# Patient Record
Sex: FEMALE | Race: WHITE | NOT HISPANIC OR LATINO | Employment: OTHER
[De-identification: names, ages, dates, MRNs, and addresses within clinical notes are randomized per-mention and may not be internally consistent; named-entity substitution may affect disease eponyms.]

---

## 2024-06-24 ENCOUNTER — TRANSCRIBE ORDERS (OUTPATIENT)
Dept: OTHER | Age: 79
End: 2024-06-24

## 2024-06-24 DIAGNOSIS — G25.3 MYOCLONIC JERKING: Primary | ICD-10-CM

## 2024-06-26 ASSESSMENT — MOVEMENT DISORDERS SOCIETY - UNIFIED PARKINSONS DISEASE RATING SCALE (MDS-UPDRS)
CHEWING_AND_SWALLOWING: (0) NORMAL: NO PROBLEMS.
SALIVA_AND_DROOLING: (0) NORMAL: NOT AT ALL (NO PROBLEMS).
EATING_TASKS: (0) NORMAL: NOT AT ALL (NO PROBLEMS).
FREEZING: (1) SLIGHTLY: I BRIEFLY FREEZE, BUT I CAN EASILY START WALKING AGAIN. I DO NOT NEED HELP FROM SOMEONE ELSE OR A WALKING AID (CANE OR WALKER) BECAUSE OF FREEZING.
HOBBIES_AND_OTHER_ACTIVITIES: (1) SLIGHT: I AM A BIT SLOW BUT DO THESE ACTIVITIES EASILY.
WALKING_AND_BALANCE: (3) MODERATE: I USUALLY USE A WALKING AID (CANE, WALKER) TO WALK SAFELY WITHOUT FALLING.  HOWEVER, I DO NOT USUALLY NEED THE SUPPORT OF ANOTHER PERSON.
TOTAL_SCORE: 16
HYGIENE: (2) MILD: I NEED SOMEONE ELSE TO HELP ME WITH SOME HYGIENE TASKS.
TREMOR: (4) SEVERE: SHAKING OR TREMOR CAUSES PROBLEMS WITH MOST OR ALL ACTIVITIES.
GETTING_OUT_OF_BED_CAR_DEEP_CHAIR: (2) MILD: I NEED MORE THAN ONE TRY TO GET UP OR NEED OCCASIONAL HELP.
DRESSING: (1) SLIGHT: I AM SLOW, BUT I DO NOT NEED HELP.
HANDWRITING: (0) NORMAL: NOT AT ALL (NO PROBLEMS).
TURNING_IN_BED: (2) MILD: I HAVE A LOT OF TROUBLE TURNING AND NEED OCCASIONAL HELP FROM SOMEONE ELSE.
SPEECH: (0) NORMAL: NOT AT ALL (NO PROBLEMS).

## 2024-06-26 NOTE — TELEPHONE ENCOUNTER
Action 6/26/24 MV 9.34am   Action Taken Imaging request faxed to [facility]     Action 6/28/24 MV 7.34am   Action Taken Images resolved       RECORDS RECEIVED FROM: external   REASON FOR VISIT: Myoclonic jerking    PROVIDER: Dr. Mathew Ortiz   DATE OF APPT: 6/28/24   NOTES (FOR ALL VISITS) STATUS DETAILS   OFFICE NOTE from referring provider Care Everywhere Dr Huan Thompson @ [facility]:  6/18/24   OFFICE NOTE from other specialist Care Everywhere Dr Tiffany Gonzales @ [facility] Neurology:  6/20/24 5/17/24   DISCHARGE REPORT from the ER Care Everywhere [facility]:  5/24/24   MEDICATION LIST Care Everywhere    IMAGING  (FOR ALL VISITS)     MRI (HEAD, NECK, SPINE) PACS [facility]:  MRI Lumbar Spine 1/2/24  MRI Thoracic Spine 11/2/23  MRI Brain 9/6/23  MRI Lumbar Spine 5/17/23  MRI Lumbar Spine 6/20/21  MRI Brain 2/5/20  MRA Head Neck 2/5/20  MRI Brain 11/25/19   CT (HEAD, NECK, SPINE) PACS [facility]:  CT Head 5/24/24  CT Lumbar Spine 3/20/24  CT Thoracic Spine 3/20/24  CT Cervical Spine 3/20/24  CT Head 3/20/24  CT Head 8/27/23  CT Head 5/11/22

## 2024-06-28 ENCOUNTER — OFFICE VISIT (OUTPATIENT)
Dept: NEUROLOGY | Facility: CLINIC | Age: 79
End: 2024-06-28
Payer: COMMERCIAL

## 2024-06-28 ENCOUNTER — PRE VISIT (OUTPATIENT)
Dept: NEUROLOGY | Facility: CLINIC | Age: 79
End: 2024-06-28

## 2024-06-28 ENCOUNTER — LAB (OUTPATIENT)
Dept: LAB | Facility: CLINIC | Age: 79
End: 2024-06-28
Payer: COMMERCIAL

## 2024-06-28 ENCOUNTER — DOCUMENTATION ONLY (OUTPATIENT)
Dept: NEUROLOGY | Facility: CLINIC | Age: 79
End: 2024-06-28

## 2024-06-28 VITALS
SYSTOLIC BLOOD PRESSURE: 118 MMHG | DIASTOLIC BLOOD PRESSURE: 69 MMHG | WEIGHT: 146 LBS | HEART RATE: 54 BPM | OXYGEN SATURATION: 94 %

## 2024-06-28 DIAGNOSIS — G93.40 ENCEPHALOPATHY, UNSPECIFIED: ICD-10-CM

## 2024-06-28 DIAGNOSIS — R27.8 OTHER LACK OF COORDINATION: ICD-10-CM

## 2024-06-28 DIAGNOSIS — G25.3 MYOCLONIC JERKING: ICD-10-CM

## 2024-06-28 DIAGNOSIS — G25.3 MYOCLONIC JERKING: Primary | ICD-10-CM

## 2024-06-28 LAB — VIT B12 SERPL-MCNC: 663 PG/ML (ref 232–1245)

## 2024-06-28 PROCEDURE — 36415 COLL VENOUS BLD VENIPUNCTURE: CPT | Performed by: PATHOLOGY

## 2024-06-28 PROCEDURE — 82175 ASSAY OF ARSENIC: CPT | Mod: 90 | Performed by: PATHOLOGY

## 2024-06-28 PROCEDURE — 82607 VITAMIN B-12: CPT | Performed by: PSYCHIATRY & NEUROLOGY

## 2024-06-28 PROCEDURE — 99000 SPECIMEN HANDLING OFFICE-LAB: CPT | Performed by: PATHOLOGY

## 2024-06-28 PROCEDURE — 83825 ASSAY OF MERCURY: CPT | Mod: 90 | Performed by: PATHOLOGY

## 2024-06-28 PROCEDURE — 87798 DETECT AGENT NOS DNA AMP: CPT | Mod: 90 | Performed by: PATHOLOGY

## 2024-06-28 PROCEDURE — 99205 OFFICE O/P NEW HI 60 MIN: CPT | Performed by: PSYCHIATRY & NEUROLOGY

## 2024-06-28 PROCEDURE — 83655 ASSAY OF LEAD: CPT | Mod: 90 | Performed by: PATHOLOGY

## 2024-06-28 PROCEDURE — 86258 DGP ANTIBODY EACH IG CLASS: CPT | Mod: 59 | Performed by: PSYCHIATRY & NEUROLOGY

## 2024-06-28 PROCEDURE — 86364 TISS TRNSGLTMNASE EA IG CLAS: CPT | Mod: 59 | Performed by: PSYCHIATRY & NEUROLOGY

## 2024-06-28 RX ORDER — CHOLECALCIFEROL (VITAMIN D3) 50 MCG
1 TABLET ORAL DAILY
COMMUNITY

## 2024-06-28 RX ORDER — CALCITONIN SALMON 200 [IU]/.09ML
1 SPRAY, METERED NASAL DAILY
COMMUNITY
Start: 2023-05-15 | End: 2024-08-08

## 2024-06-28 RX ORDER — CLOBETASOL PROPIONATE 0.5 MG/G
CREAM TOPICAL 2 TIMES DAILY
COMMUNITY
Start: 2022-09-14

## 2024-06-28 RX ORDER — CLONAZEPAM 0.25 MG/1
1 TABLET, ORALLY DISINTEGRATING ORAL 2 TIMES DAILY
COMMUNITY
Start: 2024-06-20 | End: 2024-08-08

## 2024-06-28 RX ORDER — BENZONATATE 100 MG/1
100-200 CAPSULE ORAL 2 TIMES DAILY PRN
COMMUNITY
Start: 2022-09-14

## 2024-06-28 ASSESSMENT — PAIN SCALES - GENERAL: PAINLEVEL: MILD PAIN (2)

## 2024-06-28 NOTE — PATIENT INSTRUCTIONS
(1)  I'm ordering an MRI of your spine.  It may not show a cause of yourinvoluntary movements, but we it might, and we need to checki.    (2)  I'm ordering some blood tests for very unlikely possibilitie.    (3)  As we noticed today, your movements are less frequent when your attention is focussed on something else.  You might be able to control the movements, somewhat, by taking advantage of this.  To help with that, I'm referring you to a psychologist.  Psychologist might also be able to help with anxiety that your son mentioned.    (4)  Combination of trazodone with venlafaxine could cause jerking, so we'll try stopping the trazodone.  Reduce to one-half tab for one week, then stop.  We are going to move the clonazepam to bedtime, which may compensate for the lack of trazodone, so far as sleep is concerned.    (5)  Since clonazepam does help the movements, but makes you dizzy we'll try to adjust dosing of clonazepam in a way that allows you to tolerate it without side effects.  You've been off the clonazepam for 3 days. Start takint one tab at bedtime (0.25 mg), for 10 days, then, if no problems, increase to 2 tabs (0.5 mg).

## 2024-06-28 NOTE — PROGRESS NOTES
"78 yo F with trigeminal neuralgia has been very well-controlled with oxcarbazepine referred for new trunk and limb jerking.  September 20, 2017 had a lumbar spine surgery with left L4-L5 transforaminal lumbar interbody fusion and posterior spinal instrumented fusion by Dr. Mejía. She had recurrent low back pain again and MR [facility] Jan02,2024 showed  1. Moderate neuroforaminal stenosis at the right L3-4 foramen.  2. Left L5-S1 foraminal disc protrusion contacting the exiting nerve root.  3. Mild spinal canal narrowing L3-4.  4. Degenerative endplate and facet edema at L3-4    March 20 CT CTLspine Intact posterior tommy/pedicle screw fixation and intervertebral spacer L4-5. Chronic grade 1 anterolisthesis of L4 on L5. Moderate/advanced spondylosis greatest at L3-4. Neuroforaminal and spinal canal narrowing better evaluated on MRI 1/2/2024. Atypical vertebral body hemangioma in L3 is stable since 2023. Intact posterior tommy/pedicle screw fixation and intervertebral spacer L4-5. Moderate/advanced spondylosis greatest at C5-6. Grade 1 anterolisthesis of C3 on C4 and C4 on C5. Mild/moderate bony spinal canal narrowing C5-6. Multilevel bony neuroforaminal narrowing greatest at C4-6.    April 22, 2024, she had left L5-S1 transforaminal lumbar interbody fusion and posterior spinal instrumented fusion by Dr. Mejía again.   After surgery, low back pain improved but she had more sensitivity to touch in the left posterior calf and left foot.    May24 she reported that, starting over 2 weeks previously, she'd been  experiencing \"twitching\" and \"jerking motions.\"  During that period she had 2 PT appointments and a neurology appointment without note of this, but said she'd been told by ortho PT that she 'may be in line for a stroke' and was referred by telephone triage to ER.    In the ER, she gave this history \"for the past 2 or 3 weeks she has had episodes of full body shaking that lasts for a moment. Her episodes come and go. " "They seem to be triggered by exercise specifically physical therapy. Patient had back surgery last month.\"    On exam, \"few episodes of momentary tremor in her extremities.\"  CT head showed: Small droplets of fat attenuation in the ventricles and subarachnoid spaces. This is an unusual finding and could be seen with spontaneous rupture of an intracranial or intraspinal fat-containing tumor. or from a recent spinal fracture, but given that these are stable from the comparison exam I do not think any further imaging of the spine is needed at this time unless there is concern for a new traumatic injury to the cervical, thoracic or lumbar spine.  Imp:  \"I discussed case with Dr. Lopez of stroke neurology who reviewed the images. She initially had recommended CT angio, however on further review she noted that these exact same areas were noted back in March.\"  She was told that her test results were reassuring, and was advised to follow up with her neurologist.    Jun 20 saw her neurologist.  Dr Gonzales obtained history of random jerking movements in the abdomen spreading up and down involving the whole body, more when she was sitting, less when she was walking, none when she was sleeping. Her  reported that since after the lumbar spine surgery in April 2024, she has had these jerking movements, subtle in May, increasingly worse over the last 2 weeks. On exam, hypersensitivity to light touch in the left calf and left foot, moderate weakness in foot dorsiflexion, eversion and inversion and in hip abduction, normal strength in all muscle groups of the right leg and both arms. Frequent random myoclonic movements involving the arms, legs and torso.  Etiology felt to be unclear, but  Klonopin started empirically.    Has also buck't with Dr. Madie Velasquez in [place], Oct 30.    Today  Here w/ son Felipe  Onset \"a week or 10 days\" after the spine surgery. \"we had an appointment with the surgeon and thought it might be " "to do with the surgery but he said it didn't.\"  When her son visited on Memorial day May 27, she told it had been present for \"'a couple of weeks'\" which would put the onset in May.  he says it was as frequent at onset as it is now (every several seconds), though son says it was less frequent in May.   \"Starts here, in my abdomen and I know it's coming.  More in upper than in lower body,\" but sometimes in lower body, at least in part due to mechanical transmission from the upper.  \"Sometimes starts when I'm exercising,\" e.g. in PT. No difference she's noted between walking/standing, sitting, or lying postures.  It might occur in sleep:  her  has twice woken her \"because I was shaking\" (she confirmed that \"shaking\" refers to the same movements.  The movmements have not, though, ever woken her from sleep.    Klonopin 0.25 mg bid \"slows them down, but\" only limited benefit.  Also makes her dizzy (\"when I get up to walk, sometimes my  has to help me\"), and this seems to coincide with doses.  \"Dizzy\" seems to have a cognitive side to it (\"sometimes to where I can't put two and two together\").  She stopped the clonazepam 3 days prior to today's appointment so that I could see the movements unaffected by it.    \"I can do some of my own toileting.  Usually (on clonazepam) \" helps me into the bathroom and I wash myself.  I can do some of my own toileting.  I can dress [but] if it's like it is today [3 days off clonazepam] I have a heck of a time.\"    She's noticed a cognitive decline, but her son disagrees, and says she's very anxious about that.  Was dragging L foot after surg, improved with PT not to baeline.    Never has taken prescriptin medication for severe nausea.  She's been on antidepressants since son was in high school -- for bipolar, she says.  Doesn't recall if she was ever on any other class of psychiatric medication.  No psychosis that she recalls except two very isolated episodes of " "visual hallucinosis, the first at age 13 when she suddenly, and briefly saw a house with a fir tree in front, which her friends didn't see.  The second was when she took a THC-containing product for headache and pain she saw \"something in the closet.\"     Meds:  Brings printed med list from S, which includes some meds that are not entered in her EMR. It doesn't list trazodone, but she says she *is* taking it.  Tylenol prn  ASA  Tesalon prn  Eye injections for macular degen.  Prolia  Calcitonin  Zetioa   Lsix  Metoprolol  Trieptal  Effexor  And yes she *is* on trazodone.    Fam Hx  No voluntary movements in any relative  One brother has Dx of PD (and DM).  No details available on what symptosm.  Another brother, with dementia, onset in his 60s, now severe, possibly related to lifelong heavy drinking.  Third brother  age 8, GSW, playing with a loaded firearm.  Mother with severe depression, DM, recurrent CA.  Fa heart disease \"Not a pleasant man,\" (she moved away as soon as she was old enough, and knows little about his health).  Son with an infectious health condition but nothing remotely genetic.  Daughter has lung issues related to smoking, also a tachyarrythmia, not clear if Afib.      Exam  Oriented to date, registered 3 words, recalled 2, and recognized the third from a list, imitated gestures fairly well, no extinction to double simultaneous stimulation, good sterognosis, followed complex commands well, and in history-taking appeared cognitively relatively intact.  Pupils equal, round, and equally reactive to light, no square wave jerks, saccades and smooth pursuit intact in the cardinal directions, visual fields intact to counting fingers, voice normal in rate, rhythm, phonation & articulation.  Deep tendon reflexes diffusely brisk.  Cautious (I think fearful) gait, with wheeled walker. No incoordination, but frequent jerking  movements.  These varied in severity, frequency & character.  Predominantly " axial, mostly in the saggital plane, but frequently a right _or_ left tilt, dometimes with bilateral proximal UE involvement (symmetrically) rarely in the LEs, possibly only once, and that was hip adduction sith patellar tap (see below).  Movements were provoked by tap with reflex hammer, in a very un-stereotyped fashion.  Startle habituated normally.  Movements possibly decreased with standing & walking, probably increased with lying supine (but see below). Frequency varied during conversation from one every few to several seconds, to a minute or more without any, and that led me to test very extensively for distractability, and I eventually concluded that they were distractable.  This was evident with testing for stereognosis, and particularly when lyiing supine yielded a high baseline frequency, and unexpected re-testing of verbal recall coincided in a marked reduction.    Semiology of the movements was otherwise consistent with an organic etiology, and I think that may be present, with some functional overlay.    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  Imageable cord lesions, in this syndrome, while uncommon, are important to detect.  Most often cervical or thoracic, though, given the recent surgery and cord vasculature, we will image the lumbar cord as well.  Some blod tests for unlikely, but important and easily-tested-for conditions.  Copper level deferred due to uncertainty about insurance coverage but we can consider that if the other tests are normal.  For pragmatic therapy, stop trazodone (in combination with venlafaxine, could cause or aggravate myoclonus), replace with one bedtime dose of Klonopin, and hope in that way to insinuate a  little more clonazepam into her witout hitting limiting side effects, since it seems to be at least somewhat effective and is somewhat broad spectrum.  Othr optioins include Keppra, zonisamide.  Will  not stop Trileptal, as the probability that it's contributing to the movements is very small compared to the much greater risk of aggravating trigeminal neuralgia.    Time this date with patient, reviewing records, & documentinh    Mathew Ortiz PhD, MD

## 2024-06-28 NOTE — PROGRESS NOTES
Faxed MRI orders to [facility].    Has The Growth Been Previously Biopsied?: has been previously biopsied previous_biopsy_has_been_previously_biopsied

## 2024-06-30 LAB
ARSENIC BLD-MCNC: <10 UG/L
LEAD BLDV-MCNC: <2 UG/DL
MERCURY BLD-MCNC: <2.5 UG/L

## 2024-07-01 LAB
GLIADIN IGA SER-ACNC: 0.6 U/ML
GLIADIN IGG SER-ACNC: <0.6 U/ML
TTG IGA SER-ACNC: 0.5 U/ML
TTG IGG SER-ACNC: <0.6 U/ML

## 2024-07-03 ENCOUNTER — DOCUMENTATION ONLY (OUTPATIENT)
Dept: NEUROLOGY | Facility: CLINIC | Age: 79
End: 2024-07-03

## 2024-07-03 LAB — T WHIPPLEI DNA SPEC QL NAA+PROBE: NOT DETECTED

## 2024-07-03 NOTE — PROGRESS NOTES
Received requested results from [facility]. Copy sent to Dr. Ortiz for review and sent to scanning into the patient's chart.  Emilia Tanner CMA

## 2024-07-15 ENCOUNTER — MYC MEDICAL ADVICE (OUTPATIENT)
Dept: NEUROLOGY | Facility: CLINIC | Age: 79
End: 2024-07-15
Payer: COMMERCIAL

## 2024-07-15 DIAGNOSIS — G25.3 MYOCLONIC JERKING: Primary | ICD-10-CM

## 2024-07-16 ENCOUNTER — TELEPHONE (OUTPATIENT)
Dept: NEUROLOGY | Facility: CLINIC | Age: 79
End: 2024-07-16
Payer: COMMERCIAL

## 2024-07-16 ENCOUNTER — TRANSFERRED RECORDS (OUTPATIENT)
Dept: HEALTH INFORMATION MANAGEMENT | Facility: CLINIC | Age: 79
End: 2024-07-16
Payer: COMMERCIAL

## 2024-07-16 NOTE — TELEPHONE ENCOUNTER
The orders say MRIs with and w/o contrast.    If ok with the provider can they say radiologist can determine contrast needs as if non-contrast images are ok then they do not take images with contrast. Elidia is getting scans now and they needs an answer ASAP. Verbal orders are ok.

## 2024-07-16 NOTE — TELEPHONE ENCOUNTER
[facility] Call Center    Phone Message    May a detailed message be left on voicemail: yes     Reason for Call: Other: Patricia with [facility] is asking for revised orders for Contrast to be determined by Radiologist.  Pt is currently at [facility] 07/16    Please contact Patricia at: [phone]  Fax: [phone]    Action Taken: Message routed to:  [facility] Neurology     Travel Screening: Not Applicable     Date of Service:

## 2024-07-16 NOTE — TELEPHONE ENCOUNTER
Received verbal OK from Dr. Ortiz via telephone that it is ok for the radiologist to determine the need for contrast. Called Patricia and gave the verbal orders.

## 2024-07-18 ENCOUNTER — DOCUMENTATION ONLY (OUTPATIENT)
Dept: NEUROLOGY | Facility: CLINIC | Age: 79
End: 2024-07-18

## 2024-07-18 RX ORDER — CLONAZEPAM 0.5 MG/1
0.5 TABLET ORAL AT BEDTIME
Qty: 30 TABLET | Refills: 5 | Status: SHIPPED | OUTPATIENT
Start: 2024-07-18 | End: 2024-08-16

## 2024-07-18 NOTE — PROGRESS NOTES
Received MRI report from [facility]. Copy sent to Dr. Ortiz for review and to scanning into the patient's chart.  Emilia Tanner CMA

## 2024-08-05 ENCOUNTER — DOCUMENTATION ONLY (OUTPATIENT)
Dept: NEUROLOGY | Facility: CLINIC | Age: 79
End: 2024-08-05

## 2024-08-05 NOTE — PROGRESS NOTES
Received MR Thoracic report from [facility]. Copy was sent to Dr. Ortiz and scanning.   Emilia Tanner, CMA

## 2024-08-08 ENCOUNTER — TELEPHONE (OUTPATIENT)
Dept: NEUROLOGY | Facility: CLINIC | Age: 79
End: 2024-08-08
Payer: COMMERCIAL

## 2024-08-08 ENCOUNTER — VIRTUAL VISIT (OUTPATIENT)
Dept: NEUROLOGY | Facility: CLINIC | Age: 79
End: 2024-08-08
Attending: PSYCHIATRY & NEUROLOGY
Payer: COMMERCIAL

## 2024-08-08 DIAGNOSIS — G50.0 TRIGEMINAL NEURALGIA: ICD-10-CM

## 2024-08-08 DIAGNOSIS — G25.3 MYOCLONIC JERKING: Primary | ICD-10-CM

## 2024-08-08 DIAGNOSIS — R21 RASH AND NONSPECIFIC SKIN ERUPTION: ICD-10-CM

## 2024-08-08 DIAGNOSIS — F33.42 RECURRENT MAJOR DEPRESSIVE DISORDER, IN FULL REMISSION (H): ICD-10-CM

## 2024-08-08 DIAGNOSIS — E78.5 HYPERLIPIDEMIA, UNSPECIFIED HYPERLIPIDEMIA TYPE: ICD-10-CM

## 2024-08-08 DIAGNOSIS — M81.0 OSTEOPOROSIS WITHOUT CURRENT PATHOLOGICAL FRACTURE, UNSPECIFIED OSTEOPOROSIS TYPE: ICD-10-CM

## 2024-08-08 DIAGNOSIS — Z78.9 TAKES DIETARY SUPPLEMENTS: ICD-10-CM

## 2024-08-08 DIAGNOSIS — I48.20 CHRONIC ATRIAL FIBRILLATION (H): ICD-10-CM

## 2024-08-08 DIAGNOSIS — R52 PAIN: ICD-10-CM

## 2024-08-08 DIAGNOSIS — F41.1 GAD (GENERALIZED ANXIETY DISORDER): ICD-10-CM

## 2024-08-08 DIAGNOSIS — K21.9 GASTROESOPHAGEAL REFLUX DISEASE WITHOUT ESOPHAGITIS: ICD-10-CM

## 2024-08-08 DIAGNOSIS — R60.0 LOCALIZED EDEMA: ICD-10-CM

## 2024-08-08 RX ORDER — VENLAFAXINE HYDROCHLORIDE 75 MG/1
75 CAPSULE, EXTENDED RELEASE ORAL DAILY
COMMUNITY
Start: 2024-02-29 | End: 2025-02-23

## 2024-08-08 RX ORDER — AMOXICILLIN 250 MG
2 CAPSULE ORAL EVERY MORNING
COMMUNITY
Start: 2024-04-23

## 2024-08-08 RX ORDER — METOPROLOL SUCCINATE 25 MG/1
TABLET, EXTENDED RELEASE ORAL
COMMUNITY
Start: 2023-08-29

## 2024-08-08 RX ORDER — FUROSEMIDE 20 MG
1 TABLET ORAL DAILY
COMMUNITY
Start: 2024-01-24

## 2024-08-08 RX ORDER — MUPIROCIN 20 MG/G
OINTMENT TOPICAL DAILY PRN
COMMUNITY

## 2024-08-08 RX ORDER — MAGNESIUM OXIDE 400 MG/1
400 TABLET ORAL DAILY
COMMUNITY

## 2024-08-08 RX ORDER — ASPIRIN 81 MG/1
81 TABLET ORAL DAILY
COMMUNITY
Start: 2023-10-11

## 2024-08-08 RX ORDER — CALCIUM CARBONATE 500 MG/1
1 TABLET, CHEWABLE ORAL DAILY PRN
COMMUNITY

## 2024-08-08 RX ORDER — MULTIVITAMIN,THERAPEUTIC
1 TABLET ORAL DAILY
COMMUNITY

## 2024-08-08 RX ORDER — DENOSUMAB 60 MG/ML
60 INJECTION SUBCUTANEOUS
COMMUNITY
Start: 2023-07-10

## 2024-08-08 RX ORDER — EZETIMIBE 10 MG/1
1 TABLET ORAL DAILY
COMMUNITY
Start: 2023-08-30

## 2024-08-08 RX ORDER — FAMOTIDINE 20 MG/1
20 TABLET, FILM COATED ORAL DAILY PRN
COMMUNITY

## 2024-08-08 RX ORDER — OXCARBAZEPINE 150 MG/1
75 TABLET, FILM COATED ORAL 2 TIMES DAILY
COMMUNITY
Start: 2024-04-25 | End: 2025-06-25

## 2024-08-08 RX ORDER — TRIAMCINOLONE ACETONIDE 1 MG/G
CREAM TOPICAL DAILY PRN
COMMUNITY

## 2024-08-08 NOTE — TELEPHONE ENCOUNTER
Health Call Center     Phone Message     May a detailed message be left on voicemail: yes      Reason for Call: Other: [facility] and would like clarification if these are duplicate orders from 07/16 MRI was done.  Please contact Natalie brooke [facility] for Clarification at: [phone]     Action Taken: Message routed to:  [facility] Neurology      Travel Screening: Not Applicable          Date of Service:

## 2024-08-08 NOTE — Clinical Note
Hi, Myoclonic jerking resolved very soon after stopping trazodone. She only then made the change of clonazepam 0.25mg BID to 0.5mg at bedtime, which was initially started by other provider for the myoclonus. Since it is resolved with stopping trazodone, are you ok with her reducing clonazepam from 0.5mg to 0.25mg at bedtime x 2 weeks, then stop? She is having more trouble with sleep initiation since stopping trazodone, but can see how things are in a few weeks. Thanks, Rosario

## 2024-08-08 NOTE — Clinical Note
"8/8/2024       RE: Elidia Tariq  [address]  [facility] [address]     Dear Colleague,    Thank you for referring your patient, Elidia Tariq, to the [facility] [place] [facility]. Please see a copy of my visit note below.    Medication Therapy Management (MTM) Encounter    ASSESSMENT:                            Medication Adherence/Access: No issues identified    ***:   ***    PLAN:                            ***  -recommend tapering clonazepam  -metoprolol  If you are waking during the night:  -Get out of the bed after 10 minutes of being awake and go sit in another room.  Read or engage in some other non stimulating activity without a screen until you are feeling more tired, then go back to bed.  -Try listening to guided meditation or sleep podcasts with apps like [employer] or CALM (both have free and paid versions)      Follow-up: 9/5/24    SUBJECTIVE/OBJECTIVE:                          Elidia Tariq is a 79 year old female seen for an initial visit. She was referred to me from Dr. Ortiz, Neurology. Patient was accompanied by .     Reason for visit: what meds could cause fatigue?    Allergies/ADRs: Reviewed in chart  Past Medical History: Reviewed in chart  Tobacco: She has no history on file for tobacco use.  Alcohol: 1 drink at night sometimes  Caffeine: couple cups of coffee every morning  Medication Adherence/Access: no issues reported    Bone Health    Osteoporosis:   denosumab (Prolia) 60 mg subcutaneous every 6 months (last injection February 2024, upcoming appointment)  Patient is not experiencing side effects.  Patient is getting  \"not much\" serving(s)/day of calcium in their diet.  Risk factors: post-menopausal       Hyperlipidemia:   Ezetimibe (Zetia) 10mg once daily  Patient reports no current medication side effects.  The ASCVD Risk score (Shamir DK, et al., 2019) failed to calculate for the following reasons:    " "The 2019 ASCVD risk score is only valid for ages 40 to 79    AFib:    -metoprolol ER 25mg every morning and 50mg every evening    Does not usually check BP at home, but was 93/55 the other night when she was feeling poorly, with HR 63.      Depression/Anxiety:    -venlafaxine ER 75mg daily  Has been taking for about 5 years. No concerns with mood or anxiety. Tolerates without issue.      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 At that time, trazodone was stopped and bedtime clonazepam was changed from 0.25mg BID to 0.5mg at bedtime. (Clonazepam had been initially prescribed on 6/20 for this purpose by a different prescriber).    She stopped the trazodone and \"almost immediately\" the movements stopped. A few days later she adjusted the clonazepam dose.   She is now having more trouble with sleep initiation since trazodone was stopped.   She lays down to read about 9-10pm to read, then tries to go to sleep around 11pm. Usually not sleeping until 12-1am. She may wake to urinate, but able to easily get back to sleep. She wakes between 8-10am and does not generally feel rested and feels tired during the day.  With trazodone, she was falling asleep right at 11pm and still sleeping until 9-10am, feeling more rested in the morning.    Dizziness is still present, mostly when changing positions, but she attributes this low blood pressure, which is being monitored.    Trigeminal neuralgia:    -oxcarbazepine 75mg BID   Has been very helpful, pain is resolved. No concerns.      Heartburn:    -Tums 500mg daily prn  -famotidine 20mg daily prn if Tums is not helpful    Helpful when needed.  No clear triggers, not too often.    Edema:    -furosemide 40mg daily in the summer, 20mg daily in the winter  Has more edema in the summer. Provider aware of dosing. Feels " "medication is helpful and denied issues.    Topical:    -triamcinolone cream prn  -mupirocin 2% ointment  -clobetasol cream prn  Helpful when needed. Just picks whichever one is most convenient, is unsure if she needs all of these. Hasn't discussed with provider for a while. Usually using under the breast.    Pain:    -acetaminophen 1000mg every morning  -CBD prn pain--rarely  Very helpful. No concerns.       Supplements:   -glucosamine 1500mg daily  -multivitamin daily  -magnesium 400mg daily  No reported issues at this time.     Constipation:  -senna/docusate 2 tablets daily    BM frequency: every 1-2 days  Straining: No  Water intake: 64 oz of water    Today's Vitals: There were no vitals taken for this visit.  ----------------    I spent {[facility] total time 3:763433} with this patient today{MTMpartdbillingquestion:182658}. { :368635}. A copy of the visit note was provided to the patient's provider(s).    A summary of these recommendations {GIVEN/NOT GIVEN:221292}.    ***    Telemedicine Visit Details  Type of service:  {telemedvisitmtm:951223::\"Telephone visit\"}  Start Time: {video/phone visit start time:152948}  End Time: {video/phone visit end time:152948}     Medication Therapy Recommendations  No medication therapy recommendations to display     Medication Therapy Management (MTM) Encounter    ASSESSMENT:                            Medication Adherence/Access: No issues identified    Osteoporosis:   Stable. Continue current medication. Could take Tums daily to help with adequate calcium intake.    Hyperlipidemia:   Stable. Continue current medication.    AFib:    Recommend follow up with PCP to see if should be adjusted due to dizziness and low blood pressure.    Depression/Anxiety:    Stable. Continue current medication.    Myoclonus:   Resolved since stopping trazodone. Since clonazepam was started to treat the issue, would recommend tapering and stopping the med. It may possibly be related to disrupted sleep " cycle so would revisit sleep quality after med is stopped. Most trouble today is with sleep initiation, but seems as though sleep quality is reduced as well. Discussed sleep hygiene today, getting out of bed if she's not yet sleeping (see below). May consider melatonin in the future if needed.    Trigeminal neuralgia:    Stable. Continue current medication.    Heartburn:    Stable. Continue current medication.    Edema:    Edema stable. Follow up with PCP about dizziness that may be related to furosemide.    Topical:    Encouraged her to follow up with PCP to take a look at the skin and determine best medication to use. Pt agreed.    Pain:    Stable. Continue current medication.       Supplements:   Stable. Continue current medication.    Constipation:  Stable. Continue current medication.    PLAN:                            -Follow up with primary care about which cream you should be using under the breast  -Follow up with primary care about dizziness and blood pressure, whether metoprolol or furosemide should be adjusted  -ok to take Tums every day in order to have enough calcium    -I will talk with Dr. Ortiz about tapering clonazepam and get back to you with a plan.    If you are having trouble falling asleep or are waking during the night:  -Get out of the bed after 10 minutes of being awake and go sit in another room.  Read or engage in some other non stimulating activity without a screen until you are feeling more tired, then go back to bed.  -Try listening to guided meditation or sleep podcasts with apps like Headspace or CALM (both have free and paid versions)    Follow-up: 9/5/24    SUBJECTIVE/OBJECTIVE:                          Elidia Tariq is a 79 year old female seen for an initial visit. She was referred to me from Dr. Ortiz, Neurology. Patient was accompanied by .     Reason for visit: what meds could cause fatigue?    Allergies/ADRs: Reviewed in chart  Past Medical History: Reviewed in  "chart  Tobacco: She has no history on file for tobacco use.  Alcohol: 1 drink at night sometimes  Caffeine: couple cups of coffee every morning  Medication Adherence/Access: no issues reported    Bone Health    Osteoporosis:   denosumab (Prolia) 60 mg subcutaneous every 6 months (last injection February 2024, upcoming appointment)  Patient is not experiencing side effects.  Patient is getting  \"not much\" serving(s)/day of calcium in their diet.  Risk factors: post-menopausal       Hyperlipidemia:   Ezetimibe (Zetia) 10mg once daily  Patient reports no current medication side effects.  The ASCVD Risk score (Shamir ULLOA, et al., 2019) failed to calculate for the following reasons:    The 2019 ASCVD risk score is only valid for ages 40 to 79    AFib:    -metoprolol ER 25mg every morning and 50mg every evening    Does not usually check BP at home, but was 93/55 the other night when she was feeling poorly, with HR 63.      Depression/Anxiety:    -venlafaxine ER 75mg daily  Has been taking for about 5 years. No concerns with mood or anxiety. Tolerates without issue.      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 At that time, trazodone was stopped and bedtime clonazepam was changed from 0.25mg BID to 0.5mg at bedtime. (Clonazepam had been initially prescribed on 6/20 for this purpose by a different prescriber).    She stopped the trazodone and \"almost immediately\" the movements stopped. A few days later she adjusted the clonazepam dose.   She is now having more trouble with sleep initiation since trazodone was stopped.   She lays down to read about 9-10pm to read, then tries to go to sleep around 11pm. Usually not sleeping until 12-1am. She may wake to urinate, but able to easily get back to sleep. She wakes between 8-10am and does not generally feel " rested and feels tired during the day.  With trazodone, she was falling asleep right at 11pm and still sleeping until 9-10am, feeling more rested in the morning.    Dizziness is still present, mostly when changing positions, but she attributes this low blood pressure, which is being monitored.    Trigeminal neuralgia:    -oxcarbazepine 75mg BID   Has been very helpful, pain is resolved. No concerns.      Heartburn:    -Tums 500mg daily prn  -famotidine 20mg daily prn if Tums is not helpful    Helpful when needed.  No clear triggers, not too often.    Edema:    -furosemide 40mg daily in the summer, 20mg daily in the winter  Has more edema in the summer. Provider aware of dosing. Feels medication is helpful and denied issues.    Topical:    -triamcinolone cream prn  -mupirocin 2% ointment  -clobetasol cream prn  Helpful when needed. Just picks whichever one is most convenient, is unsure if she needs all of these. Hasn't discussed with provider for a while. Usually using under the breast.    Pain:    -acetaminophen 1000mg every morning  -CBD prn pain--rarely  Very helpful. No concerns.       Supplements:   -glucosamine 1500mg daily  -multivitamin daily  -magnesium 400mg daily  No reported issues at this time.     Constipation:  -senna/docusate 2 tablets daily    BM frequency: every 1-2 days  Straining: No  Water intake: 64 oz of water    ----------------    I spent 50 minutes with this patient today. { :201083}. A copy of the visit note was provided to the patient's provider(s).    A summary of these recommendations was sent via clinic portal.    Rosario Montana, PharmD  Medication Therapy Management Pharmacist  [facility]      Telemedicine Visit Details  Type of service:  Telephone visit  Start Time:  8:00am  End Time:  8:50am     Medication Therapy Recommendations  No medication therapy recommendations to display       Again, thank you for allowing me to participate in the care  of your patient.      Sincerely,    Rosario Montana, PharmD

## 2024-08-08 NOTE — TELEPHONE ENCOUNTER
Spoke to Natalie, they have orders for cervical, thoracic and lumbar MRIs. Reviewed these were completed and further imaging was not ordered.

## 2024-08-08 NOTE — PROGRESS NOTES
Medication Therapy Management (MTM) Encounter    ASSESSMENT:                            Medication Adherence/Access: No issues identified    Osteoporosis:   Stable. Continue current medication. Could take Tums daily to help with adequate calcium intake.    Hyperlipidemia:   Stable. Continue current medication.    AFib:    Recommend follow up with PCP to see if should be adjusted due to dizziness and low blood pressure.    Depression/Anxiety:    Stable. Continue current medication.    Myoclonus:   Resolved since stopping trazodone. Since clonazepam was started to treat the issue, would recommend tapering and stopping the med. It may possibly be related to disrupted sleep cycle so would revisit sleep quality after med is stopped. Most trouble today is with sleep initiation, but seems as though sleep quality is reduced as well. Discussed sleep hygiene today, getting out of bed if she's not yet sleeping (see below). May consider melatonin in the future if needed.    Trigeminal neuralgia:    Stable. Continue current medication.    Heartburn:    Stable. Continue current medication.    Edema:    Edema stable. Follow up with PCP about dizziness that may be related to furosemide.    Topical:    Encouraged her to follow up with PCP to take a look at the skin and determine best medication to use. Pt agreed.    Pain:    Stable. Continue current medication.       Supplements:   Stable. Continue current medication.    Constipation:  Stable. Continue current medication.    PLAN:                            -Follow up with primary care about which cream you should be using under the breast  -Follow up with primary care about dizziness and blood pressure, whether metoprolol or furosemide should be adjusted  -ok to take Tums every day in order to have enough calcium    -I will talk with Dr. Ortiz about tapering clonazepam and get back to you with a plan.    If you are having trouble falling asleep or are waking during the  "night:  -Get out of the bed after 10 minutes of being awake and go sit in another room.  Read or engage in some other non stimulating activity without a screen until you are feeling more tired, then go back to bed.  -Try listening to guided meditation or sleep podcasts with apps like Headspace or CALM (both have free and paid versions)    Follow-up: 9/5/24    SUBJECTIVE/OBJECTIVE:                          Elidia Tariq is a 79 year old female seen for an initial visit. She was referred to me from Dr. Ortiz, Neurology. Patient was accompanied by .     Reason for visit: what meds could cause fatigue?    Allergies/ADRs: Reviewed in chart  Past Medical History: Reviewed in chart  Tobacco: She reports that she has never smoked. She has never used smokeless tobacco.  Alcohol: 1 drink at night sometimes  Caffeine: couple cups of coffee every morning  Medication Adherence/Access: no issues reported    Bone Health     Osteoporosis:   denosumab (Prolia) 60 mg subcutaneous every 6 months (last injection February 2024, upcoming appointment)  Patient is not experiencing side effects.  Patient is getting  \"not much\" serving(s)/day of calcium in their diet.  Risk factors: post-menopausal       Hyperlipidemia:   Ezetimibe (Zetia) 10mg once daily  Patient reports no current medication side effects.  The ASCVD Risk score (Shamir DK, et al., 2019) failed to calculate for the following reasons:    The 2019 ASCVD risk score is only valid for ages 40 to 79    AFib:    -metoprolol ER 25mg every morning and 50mg every evening    Does not usually check BP at home, but was 93/55 the other night when she was feeling poorly, with HR 63.      Depression/Anxiety:    -venlafaxine ER 75mg daily  Has been taking for about 5 years. No concerns with mood or anxiety. Tolerates without issue.      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 At that time, trazodone was stopped and bedtime clonazepam was changed from 0.25mg BID to 0.5mg at bedtime. (Clonazepam had been initially prescribed on 6/20 for this purpose by a different prescriber).    She stopped the trazodone and \"almost immediately\" the movements stopped. A few days later she adjusted the clonazepam dose.   She is now having more trouble with sleep initiation since trazodone was stopped.   She lays down to read about 9-10pm to read, then tries to go to sleep around 11pm. Usually not sleeping until 12-1am. She may wake to urinate, but able to easily get back to sleep. She wakes between 8-10am and does not generally feel rested and feels tired during the day.  With trazodone, she was falling asleep right at 11pm and still sleeping until 9-10am, feeling more rested in the morning.    Dizziness is still present, mostly when changing positions, but she attributes this low blood pressure, which is being monitored.    Trigeminal neuralgia:    -oxcarbazepine 75mg BID   Has been very helpful, pain is resolved. No concerns.      Heartburn:    -Tums 500mg daily prn  -famotidine 20mg daily prn if Tums is not helpful    Helpful when needed.  No clear triggers, not too often.    Edema:    -furosemide 40mg daily in the summer, 20mg daily in the winter  Has more edema in the summer. Provider aware of dosing. Feels medication is helpful and denied issues.    Topical:    -triamcinolone cream prn  -mupirocin 2% ointment  -clobetasol cream prn  Helpful when needed. Just picks whichever one is most convenient, is unsure if she needs all of these. Hasn't discussed with provider for a while. Usually using under the breast.    Pain:    -acetaminophen 1000mg every morning  -CBD prn pain--rarely  Very helpful. No concerns.       Supplements:   -glucosamine 1500mg daily  -multivitamin daily  -magnesium 400mg daily  No reported issues at this time. "     Constipation:  -senna/docusate 2 tablets daily    BM frequency: every 1-2 days  Straining: No  Water intake: 64 oz of water    ----------------    I spent 50 minutes with this patient today. A copy of the visit note was provided to the patient's provider(s).    A summary of these recommendations was sent via clinic portal.    Rosario Montana PharmD  Medication Therapy Management Pharmacist  [facility]      Telemedicine Visit Details  Type of service:  Telephone visit  Start Time:  8:00am  End Time:  8:50am     Medication Therapy Recommendations  Myoclonic jerking    Current Medication: clonazePAM (KLONOPIN) 0.5 MG tablet   Rationale: No medical indication at this time - Unnecessary medication therapy - Indication   Recommendation: Discontinue Medication - consider decreasing and stopping med   Status: Contact Provider - Awaiting Response

## 2024-08-08 NOTE — PATIENT INSTRUCTIONS
"Recommendations from today's MTM visit:                                                    MTM (medication therapy management) is a service provided by a clinical pharmacist designed to help you get the most of out of your medicines.   Today we reviewed what your medicines are for, how to know if they are working, that your medicines are safe and how to make your medicine regimen as easy as possible.      -Follow up with primary care about which cream you should be using under the breast  -Follow up with primary care about dizziness and blood pressure, whether metoprolol or furosemide should be adjusted  -ok to take Tums every day in order to have enough calcium    -I will talk with Dr. Ortiz about tapering clonazepam and get back to you with a plan.    If you are having trouble falling asleep or are waking during the night:  -Get out of the bed after 10 minutes of being awake and go sit in another room.  Read or engage in some other non stimulating activity without a screen until you are feeling more tired, then go back to bed.  -Try listening to guided meditation or sleep podcasts with apps like [employer] or CALM (both have free and paid versions)    Follow-up: 9/5/24    It was great speaking with you today.  I value your experience and would be very thankful for your time in providing feedback in our clinic survey. In the next few days, you may receive an email or text message from [email or web address] with a link to a survey related to your  clinical pharmacist.\"     To schedule another MTM appointment, please call the clinic directly or you may call the MTM scheduling line at [phone].    My Clinical Pharmacist's contact information:                                                      Please feel free to contact me with any questions or concerns you have.      Rosario Montana, Ramo  Medication Therapy Management Pharmacist  [facility]    "

## 2024-08-16 RX ORDER — CLONAZEPAM 0.5 MG/1
0.25 TABLET ORAL AT BEDTIME
COMMUNITY
Start: 2024-08-17 | End: 2024-08-31

## 2024-08-16 NOTE — PROGRESS NOTES
Per Dr. Ortiz, ok to reduce clonazepam from 0.5mg to 0.25mg nightly x 2 weeks, then stop.  [employer] message sent to patient.    Rosario Montana, PharmD  Medication Therapy Management Pharmacist  [facility]

## 2024-10-13 ASSESSMENT — MOVEMENT DISORDERS SOCIETY - UNIFIED PARKINSONS DISEASE RATING SCALE (MDS-UPDRS)
GETTING_OUT_OF_BED_CAR_DEEP_CHAIR: (2) MILD: I NEED MORE THAN ONE TRY TO GET UP OR NEED OCCASIONAL HELP.
FREEZING: (1) SLIGHTLY: I BRIEFLY FREEZE, BUT I CAN EASILY START WALKING AGAIN. I DO NOT NEED HELP FROM SOMEONE ELSE OR A WALKING AID (CANE OR WALKER) BECAUSE OF FREEZING.
SPEECH: (0) NORMAL: NOT AT ALL (NO PROBLEMS).
HANDWRITING: (3) MODERATE: MANY WORDS ARE UNCLEAR AND DIFFICULT TO READ.
TREMOR: (3) MODERATE: SHAKING OR TREMOR CAUSES PROBLEMS WITH MANY OF MY DAILY ACTIVITIES.
DRESSING: (1) SLIGHT: I AM SLOW, BUT I DO NOT NEED HELP.
SALIVA_AND_DROOLING: (0) NORMAL: NOT AT ALL (NO PROBLEMS).
TOTAL_SCORE: 20
HYGIENE: (2) MILD: I NEED SOMEONE ELSE TO HELP ME WITH SOME HYGIENE TASKS.
CHEWING_AND_SWALLOWING: (0) NORMAL: NO PROBLEMS.
HOBBIES_AND_OTHER_ACTIVITIES: (3) MODERATE: I HAVE MAJOR PROBLEMS DOING THESE ACTIVITIES, BUT STILL DO MOST.
TURNING_IN_BED: (2) MILD: I HAVE A LOT OF TROUBLE TURNING AND NEED OCCASIONAL HELP FROM SOMEONE ELSE.
WALKING_AND_BALANCE: (3) MODERATE: I USUALLY USE A WALKING AID (CANE, WALKER) TO WALK SAFELY WITHOUT FALLING.  HOWEVER, I DO NOT USUALLY NEED THE SUPPORT OF ANOTHER PERSON.
EATING_TASKS: (0) NORMAL: NOT AT ALL (NO PROBLEMS).

## 2024-10-18 ENCOUNTER — OFFICE VISIT (OUTPATIENT)
Dept: NEUROLOGY | Facility: CLINIC | Age: 79
End: 2024-10-18
Payer: COMMERCIAL

## 2024-10-18 VITALS
OXYGEN SATURATION: 97 % | DIASTOLIC BLOOD PRESSURE: 72 MMHG | HEART RATE: 89 BPM | SYSTOLIC BLOOD PRESSURE: 116 MMHG | WEIGHT: 150 LBS

## 2024-10-18 DIAGNOSIS — G25.3 MYOCLONIC JERKING: Primary | ICD-10-CM

## 2024-10-18 PROCEDURE — 99204 OFFICE O/P NEW MOD 45 MIN: CPT | Mod: GC | Performed by: PSYCHIATRY & NEUROLOGY

## 2024-10-18 NOTE — PATIENT INSTRUCTIONS
"As long as you are not having the bothersome involuntary movements, you can continue to take trazodone. This does run the rist of it worsening your movements again. The tremor/leg movements you have when doing physical therapy are more likely to be caused by deconditioning. Keep working on staying active!    You could consider trying to take melatonin an hour or two before you want to fall asleep. Sometimes it is more effective when taken earlier.     Chemotherapy can cause \"brain fog\" that persists long after chemotherapy has been completed.     Follow up as needed if anything changes.   "

## 2024-10-18 NOTE — PROGRESS NOTES
"Department of Neurology  Movement Disorders Division   Follow-up Note    Patient: Elidia Tariq   MRN: [id number]   : 1945   Date of Visit: 10/18/2024     Chief Complaint:  Elidia Tariq is a 79 year old female with trigeminal neuralgia on oxcarbazapine who returns to clinic for follow up of trunk and limb myoclonus.    Interval History:  Ms. Tariq was initially seen as a new patient in 2024 for new onset trunk and limb movements. She was advised to replaced bedtime trazodone with bedtime dose of clonazepam. Blood work ordered at that appointment was all within normal limits. She has had whole spine imaging which showed degenerative changes, most notably in cervical spine, without clear cord signal abnormalities.     Ms. Tariq presents today with her , Isaiah, and emotional support dog, Sunshine.     They report she is generally doing moderate to fair although she has some bad days. Her movements are significantly better since being off trazodone. She continues to get a little bit of jerking when she is very tired. She also continues to get some movements when doing physical therapy exercise. She describes shakiness in her legs when going up on her toes/heels. She is disappointed that this is still present.    She has still been working with physical therapy. She has taken a three week break from PT while awaiting this appointment with Dr. Ortiz.     Some days she struggles to concentrate. She may break off in the middle of a sentence. She feels that this has gotten worse over time but has been present for years -  feels it is \"marginally worse.\" They feel they noticed this shortly after she completed treatment for non Hodgkin's lymphoma with chemotherapy. 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     Sleep has been a challenge since she stopped trazodone. She would lay awake 2-3 hours of sleep/night or more. She is back to taking a full tablet of trazodone 50 mg for the past week. She had noticed some " of the leg movements returning prior to resuming trazodone.     She tried taking clonazepam at night - this just made her drowsy but didn't help her actually fall asleep. She is not taking clonazepam currently. She takes 20 mg at melatonin around 10:15-10:30pm. She reads in bed. She shoots to fall asleep by 11pm.     She is still taking oxcarbazepine for trigeminal neuralgia which controls symptoms well.     Her  feels that her stressful relationship with their daughter is contributing to poor sleep.     She has a therapist but hasn't seen them in a year and a half. Money is a big issue for them so she is hesitant to start it again.      They bought her a bicycle with three wheels which she really enjoys. She is currently riding it a block - block and a half loop. She feels really shaky after doing this. She doesn't do this every day - depends on fatigue, weather, etc. They have an indoor bike but that doesn't have much resistance. She plans to use this during the winter.     Current Medications:   Current Outpatient Medications   Medication Sig Dispense Refill    aspirin 81 MG EC tablet Take 81 mg by mouth daily      benzonatate (TESSALON) 100 MG capsule Take 100-200 mg by mouth 2 times daily as needed      calcium carbonate (TUMS) 500 MG chewable tablet Take 1 chew tab by mouth daily as needed for heartburn      CANNABIDIOL PO Take 1 tablet by mouth daily as needed (pain)      clobetasol propionate (TEMOVATE) 0.05 % external cream Apply topically 2 times daily prn      denosumab (PROLIA) 60 mg/mL injection Inject 60 mg subcutaneously every 6 months      ezetimibe (ZETIA) 10 MG tablet Take 1 tablet by mouth daily      furosemide (LASIX) 20 MG tablet Take 1 tablet by mouth daily      Glucosamine-Chondroit-Vit C-Mn (GLUCOSAMINE 1500 COMPLEX PO) Take 1 tablet by mouth every morning      magnesium oxide (MAG-OX) 400 MG tablet Take 400 mg by mouth daily      metoprolol succinate ER (TOPROL XL) 25 MG 24 hr tablet  Take 1 Tablet by mouth every morning AND 2 Tablets every evening. Do not crush or chew.      multivitamin, therapeutic (THERA-VIT) TABS tablet Take 1 tablet by mouth daily      mupirocin (BACTROBAN) 2 % external ointment Apply topically daily as needed (rash)      OXcarbazepine (TRILEPTAL) 150 MG tablet Take 75 mg by mouth 2 times daily      senna-docusate (SENOKOT-S/PERICOLACE) 8.6-50 MG tablet Take 2 tablets by mouth every morning      triamcinolone (KENALOG) 0.1 % external cream Apply topically daily as needed for irritation      venlafaxine (EFFEXOR XR) 75 MG 24 hr capsule Take 75 mg by mouth daily      Vitamin D3 50 mcg (2000 units) tablet Take 1 tablet by mouth daily      famotidine (PEPCID) 20 MG tablet Take 20 mg by mouth daily as needed (heartburn)         Allergies: is allergic to acetaminophen, oxycodone, propoxyphene, simvastatin, soy allergy, and acetaminophen-codeine.    Past Medical History:  No past medical history on file.    Past Surgical History:  Past Surgical History:   Procedure Laterality Date    IR LYMPH NODE BIOPSY  10/25/2017       Social History:  Social History     Socioeconomic History    Marital status:    Tobacco Use    Smoking status: Never    Smokeless tobacco: Never     Social Determinants of Health     Financial Resource Strain: Low Risk  (2/29/2024)    Received from [employer]    Overall Financial Resource Strain (CARDIA)     Difficulty of Paying Living Expenses: Not very hard   Food Insecurity: No Food Insecurity (4/22/2024)    Received from [employer] [facility]    Hunger Vital Sign     Worried About Running Out of Food in the Last Year: Never true     Ran Out of Food in the Last Year: Never true   Transportation Needs: No Transportation Needs (4/22/2024)    [facility] [employer] [facility]    Transportation Needs     In the past 12 months, has lack of transportation kept you from medical appointments, meetings, work, or from  getting medicines or things needed for daily living?: No   Physical Activity: Insufficiently Active (2/29/2024)    [facility]    Exercise Vital Sign     Days of Exercise per Week: 3 days     Minutes of Exercise per Session: 20 min   Stress: No Stress Concern Present (2/29/2024)    [facility]    Citizen of [place] [employer] - Occupational Stress Questionnaire     Feeling of Stress : Only a little   Social Connections: Moderately Isolated (2/29/2024)    Received from [facility]    Social Connection and Isolation Panel [NHANES]     Frequency of Communication with Friends and Family: More than three times a week     Frequency of Social Gatherings with Friends and Family: Patient declined     Attends Confucianist Services: Never     Active Member of Clubs or Organizations: No     Attends Club or Organization Meetings: Never     Marital Status:    Interpersonal Safety: Not At Risk (7/3/2024)    Received from [facility] and [facility] include [facility] beds at the [facility]     IP Custom IPV     Do you feel UNSAFE in any of your personal relationships with your family members or any other acquaintances?: No   Housing Stability: Unknown (4/22/2024)    [facility]    Housing Stability Vital Sign     Unable to Pay for Housing in the Last Year: No       Family History:  No family history on file.    Physical Exam:  The patient's  weight is 68 kg (150 lb). Her blood pressure is 116/72 and her pulse is 89. Her oxygen saturation is 97%.    Neurological Examination:     CN: Visual fields full, extraocular movements intact without nystagmus, face symmetric at rest and with activation, sensation intact and symmetric, hearing intact to voice, no dysarthria, palate elevates symmetrically, tongue midline.     Motor: No pronator drift. Mild paratonia in BUE without underlying rigidity. Full strength proximally  and distally in BUE/BLE. Normal finger taps, hand open close, pronation supination, toe and heel taps. No involuntary movements seen. When standing on toes, legs get somewhat tremulous.     Sensation: Intact to light touch throughout    Coordination: Normal finger to nose bilaterally    Gait: Stooped posture. Able to take several steps without walker.       Data Reviewed:   Virtual visit note with Rosario Montana PharmD from 8/8/24    MRI Cervical, Thoracic, Lumbar Spine   Personally reviewed, degeneratives without clear cord involvement.     Impression:  Elidia Tariq is a 79 year old female with trigeminal neuralgia on oxcarbazapine who returns to clinic for follow up of trunk and limb myoclonus. She reports near resolution of myoclonus with cessation of trazodone. She has resumed trazodone over the past week due to insomnia but feels that movements remain manageable. We discussed taking melatonin earlier to see if this helps sleep. Whole spine imaging obtained since her last appointment did not show clear cord pathology to explain truncal myoclonus.     We discussed cognitive problems which have been present since she finished chemotherapy 6 years. Advised that these are likely related to chemotherapy and unfortunately there are limited treatment options for chemotherapy induced cognitive changes.     Recommendations:     - Ok to continue taking trazodone for sleep as long as myoclonus is not bothersome   - If bothersome myoclonus returns, would advise stopping trazodone again  - Encouraged continued work with physical therapy, staying active  - Discussing trying to take melatonin 1-2 hours before bedtime    Follow up as needed    Time Spent: 40 minutes spent on the date of the encounter doing chart review, history and exam, documentation and further activities as noted above    Ms. Tariq was seen and discussed with movement disorder attending, Dr. Ortiz.     Taylor Song MD  Movement Disorder Fellow    Patient  seen and examined with Dr. Song and I agree with the assessment and plan as outlined.    Mathew Ortiz PhD, MD

## 2025-03-16 ENCOUNTER — HEALTH MAINTENANCE LETTER (OUTPATIENT)
Age: 80
End: 2025-03-16

## 2025-04-27 ENCOUNTER — HEALTH MAINTENANCE LETTER (OUTPATIENT)
Age: 80
End: 2025-04-27